# Patient Record
Sex: FEMALE | Race: WHITE | NOT HISPANIC OR LATINO | URBAN - METROPOLITAN AREA
[De-identification: names, ages, dates, MRNs, and addresses within clinical notes are randomized per-mention and may not be internally consistent; named-entity substitution may affect disease eponyms.]

---

## 2017-01-11 ENCOUNTER — EMERGENCY (EMERGENCY)
Facility: HOSPITAL | Age: 82
LOS: 1 days | Discharge: HOME | End: 2017-01-11
Attending: EMERGENCY MEDICINE | Admitting: EMERGENCY MEDICINE
Payer: MEDICARE

## 2017-01-11 VITALS
DIASTOLIC BLOOD PRESSURE: 88 MMHG | HEART RATE: 82 BPM | TEMPERATURE: 98 F | RESPIRATION RATE: 16 BRPM | OXYGEN SATURATION: 100 % | SYSTOLIC BLOOD PRESSURE: 154 MMHG

## 2017-01-11 VITALS
DIASTOLIC BLOOD PRESSURE: 72 MMHG | TEMPERATURE: 99 F | RESPIRATION RATE: 18 BRPM | SYSTOLIC BLOOD PRESSURE: 149 MMHG | OXYGEN SATURATION: 98 % | HEART RATE: 72 BPM

## 2017-01-11 DIAGNOSIS — F41.9 ANXIETY DISORDER, UNSPECIFIED: ICD-10-CM

## 2017-01-11 DIAGNOSIS — R00.0 TACHYCARDIA, UNSPECIFIED: ICD-10-CM

## 2017-01-11 DIAGNOSIS — J02.9 ACUTE PHARYNGITIS, UNSPECIFIED: ICD-10-CM

## 2017-01-11 DIAGNOSIS — I10 ESSENTIAL (PRIMARY) HYPERTENSION: ICD-10-CM

## 2017-01-11 DIAGNOSIS — R06.00 DYSPNEA, UNSPECIFIED: ICD-10-CM

## 2017-01-11 LAB
ALBUMIN SERPL ELPH-MCNC: 4.3 G/DL — SIGNIFICANT CHANGE UP (ref 3.3–5)
ALP SERPL-CCNC: 103 U/L — SIGNIFICANT CHANGE UP (ref 40–120)
ALT FLD-CCNC: 17 U/L RC — SIGNIFICANT CHANGE UP (ref 10–45)
ANION GAP SERPL CALC-SCNC: 19 MMOL/L — HIGH (ref 5–17)
APPEARANCE UR: CLEAR — SIGNIFICANT CHANGE UP
APTT BLD: 34 SEC — SIGNIFICANT CHANGE UP (ref 27.5–37.4)
AST SERPL-CCNC: 21 U/L — SIGNIFICANT CHANGE UP (ref 10–40)
BASOPHILS # BLD AUTO: 0 K/UL — SIGNIFICANT CHANGE UP (ref 0–0.2)
BASOPHILS NFR BLD AUTO: 0.2 % — SIGNIFICANT CHANGE UP (ref 0–2)
BILIRUB SERPL-MCNC: 0.3 MG/DL — SIGNIFICANT CHANGE UP (ref 0.2–1.2)
BILIRUB UR-MCNC: NEGATIVE — SIGNIFICANT CHANGE UP
BUN SERPL-MCNC: 28 MG/DL — HIGH (ref 7–23)
CALCIUM SERPL-MCNC: 11 MG/DL — HIGH (ref 8.4–10.5)
CHLORIDE SERPL-SCNC: 101 MMOL/L — SIGNIFICANT CHANGE UP (ref 96–108)
CK SERPL-CCNC: 63 U/L — SIGNIFICANT CHANGE UP (ref 25–170)
CO2 SERPL-SCNC: 23 MMOL/L — SIGNIFICANT CHANGE UP (ref 22–31)
COLOR SPEC: YELLOW — SIGNIFICANT CHANGE UP
CREAT SERPL-MCNC: 1.42 MG/DL — HIGH (ref 0.5–1.3)
D DIMER BLD IA.RAPID-MCNC: 328 NG/ML DDU — HIGH
DIFF PNL FLD: NEGATIVE — SIGNIFICANT CHANGE UP
EOSINOPHIL # BLD AUTO: 0.1 K/UL — SIGNIFICANT CHANGE UP (ref 0–0.5)
EOSINOPHIL NFR BLD AUTO: 0.6 % — SIGNIFICANT CHANGE UP (ref 0–6)
EPI CELLS # UR: SIGNIFICANT CHANGE UP /HPF
GAS PNL BLDV: SIGNIFICANT CHANGE UP
GLUCOSE SERPL-MCNC: 133 MG/DL — HIGH (ref 70–99)
GLUCOSE UR QL: NEGATIVE — SIGNIFICANT CHANGE UP
HCT VFR BLD CALC: 38.2 % — SIGNIFICANT CHANGE UP (ref 34.5–45)
HGB BLD-MCNC: 13.7 G/DL — SIGNIFICANT CHANGE UP (ref 11.5–15.5)
HYALINE CASTS # UR AUTO: ABNORMAL
INR BLD: 1.05 RATIO — SIGNIFICANT CHANGE UP (ref 0.88–1.16)
KETONES UR-MCNC: NEGATIVE — SIGNIFICANT CHANGE UP
LEUKOCYTE ESTERASE UR-ACNC: ABNORMAL
LIDOCAIN IGE QN: 32 U/L — SIGNIFICANT CHANGE UP (ref 7–60)
LYMPHOCYTES # BLD AUTO: 1.7 K/UL — SIGNIFICANT CHANGE UP (ref 1–3.3)
LYMPHOCYTES # BLD AUTO: 12.6 % — LOW (ref 13–44)
MCHC RBC-ENTMCNC: 33.4 PG — SIGNIFICANT CHANGE UP (ref 27–34)
MCHC RBC-ENTMCNC: 35.8 GM/DL — SIGNIFICANT CHANGE UP (ref 32–36)
MCV RBC AUTO: 93.1 FL — SIGNIFICANT CHANGE UP (ref 80–100)
MONOCYTES # BLD AUTO: 0.7 K/UL — SIGNIFICANT CHANGE UP (ref 0–0.9)
MONOCYTES NFR BLD AUTO: 5 % — SIGNIFICANT CHANGE UP (ref 2–14)
NEUTROPHILS # BLD AUTO: 10.9 K/UL — HIGH (ref 1.8–7.4)
NEUTROPHILS NFR BLD AUTO: 81.6 % — HIGH (ref 43–77)
NITRITE UR-MCNC: NEGATIVE — SIGNIFICANT CHANGE UP
NT-PROBNP SERPL-SCNC: 386 PG/ML — HIGH (ref 0–300)
PH UR: 8 — SIGNIFICANT CHANGE UP (ref 4.8–8)
PLATELET # BLD AUTO: 297 K/UL — SIGNIFICANT CHANGE UP (ref 150–400)
POTASSIUM SERPL-MCNC: 3.9 MMOL/L — SIGNIFICANT CHANGE UP (ref 3.5–5.3)
POTASSIUM SERPL-SCNC: 3.9 MMOL/L — SIGNIFICANT CHANGE UP (ref 3.5–5.3)
PROT SERPL-MCNC: 8.5 G/DL — HIGH (ref 6–8.3)
PROT UR-MCNC: 100 MG/DL
PROTHROM AB SERPL-ACNC: 11.4 SEC — SIGNIFICANT CHANGE UP (ref 10–13.1)
RAPID RVP RESULT: SIGNIFICANT CHANGE UP
RBC # BLD: 4.1 M/UL — SIGNIFICANT CHANGE UP (ref 3.8–5.2)
RBC # FLD: 12.4 % — SIGNIFICANT CHANGE UP (ref 10.3–14.5)
RBC CASTS # UR COMP ASSIST: SIGNIFICANT CHANGE UP /HPF (ref 0–2)
SODIUM SERPL-SCNC: 143 MMOL/L — SIGNIFICANT CHANGE UP (ref 135–145)
SP GR SPEC: >1.03 — HIGH (ref 1.01–1.02)
TROPONIN T SERPL-MCNC: <0.01 NG/ML — SIGNIFICANT CHANGE UP (ref 0–0.06)
UROBILINOGEN FLD QL: NEGATIVE — SIGNIFICANT CHANGE UP
WBC # BLD: 13.4 K/UL — HIGH (ref 3.8–10.5)
WBC # FLD AUTO: 13.4 K/UL — HIGH (ref 3.8–10.5)
WBC UR QL: SIGNIFICANT CHANGE UP /HPF (ref 0–5)

## 2017-01-11 PROCEDURE — 82947 ASSAY GLUCOSE BLOOD QUANT: CPT

## 2017-01-11 PROCEDURE — 99284 EMERGENCY DEPT VISIT MOD MDM: CPT | Mod: 25

## 2017-01-11 PROCEDURE — 83880 ASSAY OF NATRIURETIC PEPTIDE: CPT

## 2017-01-11 PROCEDURE — 71275 CT ANGIOGRAPHY CHEST: CPT

## 2017-01-11 PROCEDURE — 85610 PROTHROMBIN TIME: CPT

## 2017-01-11 PROCEDURE — 87086 URINE CULTURE/COLONY COUNT: CPT

## 2017-01-11 PROCEDURE — 82330 ASSAY OF CALCIUM: CPT

## 2017-01-11 PROCEDURE — 85379 FIBRIN DEGRADATION QUANT: CPT

## 2017-01-11 PROCEDURE — 84484 ASSAY OF TROPONIN QUANT: CPT

## 2017-01-11 PROCEDURE — 80053 COMPREHEN METABOLIC PANEL: CPT

## 2017-01-11 PROCEDURE — 85730 THROMBOPLASTIN TIME PARTIAL: CPT

## 2017-01-11 PROCEDURE — 83690 ASSAY OF LIPASE: CPT

## 2017-01-11 PROCEDURE — 84132 ASSAY OF SERUM POTASSIUM: CPT

## 2017-01-11 PROCEDURE — 83605 ASSAY OF LACTIC ACID: CPT

## 2017-01-11 PROCEDURE — 87798 DETECT AGENT NOS DNA AMP: CPT

## 2017-01-11 PROCEDURE — 93005 ELECTROCARDIOGRAM TRACING: CPT

## 2017-01-11 PROCEDURE — 93010 ELECTROCARDIOGRAM REPORT: CPT

## 2017-01-11 PROCEDURE — 87581 M.PNEUMON DNA AMP PROBE: CPT

## 2017-01-11 PROCEDURE — 85027 COMPLETE CBC AUTOMATED: CPT

## 2017-01-11 PROCEDURE — 85014 HEMATOCRIT: CPT

## 2017-01-11 PROCEDURE — 82550 ASSAY OF CK (CPK): CPT

## 2017-01-11 PROCEDURE — 82803 BLOOD GASES ANY COMBINATION: CPT

## 2017-01-11 PROCEDURE — 84295 ASSAY OF SERUM SODIUM: CPT

## 2017-01-11 PROCEDURE — 82962 GLUCOSE BLOOD TEST: CPT

## 2017-01-11 PROCEDURE — 82435 ASSAY OF BLOOD CHLORIDE: CPT

## 2017-01-11 PROCEDURE — 87633 RESP VIRUS 12-25 TARGETS: CPT

## 2017-01-11 PROCEDURE — 81001 URINALYSIS AUTO W/SCOPE: CPT

## 2017-01-11 PROCEDURE — 87486 CHLMYD PNEUM DNA AMP PROBE: CPT

## 2017-01-11 PROCEDURE — 82565 ASSAY OF CREATININE: CPT

## 2017-01-11 PROCEDURE — 99285 EMERGENCY DEPT VISIT HI MDM: CPT | Mod: 25

## 2017-01-11 PROCEDURE — 71275 CT ANGIOGRAPHY CHEST: CPT | Mod: 26

## 2017-01-11 RX ORDER — SODIUM CHLORIDE 9 MG/ML
3 INJECTION INTRAMUSCULAR; INTRAVENOUS; SUBCUTANEOUS ONCE
Qty: 0 | Refills: 0 | Status: COMPLETED | OUTPATIENT
Start: 2017-01-11 | End: 2017-01-11

## 2017-01-11 RX ORDER — SODIUM CHLORIDE 9 MG/ML
1000 INJECTION INTRAMUSCULAR; INTRAVENOUS; SUBCUTANEOUS ONCE
Qty: 0 | Refills: 0 | Status: COMPLETED | OUTPATIENT
Start: 2017-01-11 | End: 2017-01-11

## 2017-01-11 RX ADMIN — SODIUM CHLORIDE 3 MILLILITER(S): 9 INJECTION INTRAMUSCULAR; INTRAVENOUS; SUBCUTANEOUS at 10:30

## 2017-01-11 RX ADMIN — SODIUM CHLORIDE 1000 MILLILITER(S): 9 INJECTION INTRAMUSCULAR; INTRAVENOUS; SUBCUTANEOUS at 11:51

## 2017-01-11 NOTE — ED ADULT NURSE NOTE - OBJECTIVE STATEMENT
87yo female BIBA alert, oriented to person, place, time, situation but is confused when asked questions, c/o cold like symptoms for the past few days. Pt has had "cold like symptoms" for past 3 days, as per daughter. Pt called son, who is a physician, this morning and son was concerned pt seemed out of it/not herself this morning. Upon arrival to ED, pt tachypneic to 24, mildly tachycardic to 101 in ED. Normal tensive. CA temp 99.5. Lung sounds clear. Abd soft nontender nondistended. Pt denies fevers, chills, n/v/d, dizziness, cp, palpitations, abd pain, recent falls/hitting head. Pt lives at home by herself, is usually independent/does not require help at home.

## 2017-01-11 NOTE — ED PROVIDER NOTE - ATTENDING CONTRIBUTION TO CARE
88 yof pmhx htn, anxiety/depression, remote hx of uterine ca sp resection/chemo approx 20 yrs ago in remission since then, presents w sob. per family, pt has had uri sxs x2 weeks w nonproductive cough, sore throat and nasal congestion which seemed to be improving. pt lives alone and manages some of her adls alone. this am pt called her son who is a urologist and complained of not feeling well - son thought she sounded somewhat confued on the phone so called 911. in ed pt denies any cp or sob, is at her mental status baseline per daughter who is here in ed w pt. no abd pain, no n/v/d, denies ongoing cough or congestion. is [place]. on exam, has mild resting dyspnea and mild tachycardia - daughter states she seems anxious in ED. no hypoxia on ra. lungs ctab, heent wnl, abd soft nontender, pt w low grade fever rectaly. ? PE in setting of remote ca, vs viral uri vs pna vs acs (low prob - no hx of acs). 88 yof holocaust survivor pmhx htn, anxiety/depression, remote hx of uterine ca sp resection/chemo approx 20 yrs ago in remission since then, presents w sob. per family, pt has had uri sxs x2 weeks w nonproductive cough, sore throat and nasal congestion which seemed to be improving. pt lives alone and manages some of her adls alone. this am pt called her son who is a urologist and complained of not feeling well - son thought she sounded somewhat confued on the phone so called 911. in ed pt denies any cp or sob, is at her mental status baseline per daughter who is here in ed w pt. no abd pain, no n/v/d, denies ongoing cough or congestion. is [place]. on exam, has mild resting dyspnea and mild tachycardia - daughter states she seems anxious in ED. no hypoxia on ra. lungs ctab, heent wnl, abd soft nontender, pt w low grade fever rectaly. ? PE in setting of remote ca, vs viral uri vs pna vs acs (low prob - no hx of acs). 88 yof holocaust survivor pmhx htn, anxiety/depression, remote hx of uterine ca sp resection/chemo approx 20 yrs ago in remission since then, presents w sob. per family, pt has had uri sxs x2 weeks w nonproductive cough, sore throat and nasal congestion which seemed to be improving. pt lives alone and manages some of her adls alone. this am pt called her son who is a urologist and complained of not feeling well - son thought she sounded somewhat confused on the phone so called 911. in ed pt denies any cp or sob, is at her mental status baseline per daughter who is here in ed w pt. no abd pain, no n/v/d, denies ongoing cough or congestion. is [place].     ROS:   constitutional - no fever, no chills  eyes - no visual changes, no redness  eent - no sore throat, no nasal congestion  cvs - no chest pain, no leg swelling  resp - + sob, + cough  gi - no abdominal pain, no vomiting, no diarrhea  gu - no dysuria, no hematuria  msk - no acute back pain, no joint swelling  skin - no rashes, no jaundice  neuro - no headache, no focal weakness  psych - + anxiety and depression    Physical Exam:   constitutional - well appearing, awake and alert, oriented x3, very hard of hearing  head - no external evidence of trauma  cvs - rrr, no murmurs, no peripheral edema  resp - breath sounds clear and equal bilat, however has mild tachypnea at rest  gi - abdomen soft and nontender, no rigidity, guarding or rebound, bowel sounds present  msk - moving all extremities spontaneously  neuro - alert and oriented x3, no focal deficits, CNs 2-12 grossly intact  skin- no jaundice, warm and dry  psych - mood and affect wnl, no apparent risk to self or others , mild anxiety    on exam, has mild resting dyspnea and mild tachycardia - daughter states she seems anxious in ED. no hypoxia on ra. lungs ctab, heent wnl, abd soft nontender, pt w low grade fever rectally. ? PE in setting of remote ca, vs viral uri vs pna vs acs (low prob - no hx of acs). sent dimer which was positive - cta chest neg for pe or pna. mild sundar - son does not know if this is her baseline creat. pt remained pain free and well appearing in ed. will send home w strict return precautions. ? whether mild incr resp rate is assoc w anxiety. additional verbal instructions regarding diagnosis, return precautions and follow up plan given to pt and/or family. CHEN oRberts MD

## 2017-01-12 LAB
CULTURE RESULTS: SIGNIFICANT CHANGE UP
SPECIMEN SOURCE: SIGNIFICANT CHANGE UP

## 2023-11-13 NOTE — ED ADULT NURSE NOTE - TOBACCO USE
Cardiac Rehab: Review of chart done for Sharona Lopez for elevated troponin. She was admitted with chest pain 11/10/2023 and had a STEMI with v fib arrest and subsequent stent with Dr Castillo Adjutant.  Discharged over the weekend so a referral will be placed and will follow up by phone for enrollment in [facility] Bishnu Aguayo RN
Never smoker